# Patient Record
Sex: FEMALE | Race: WHITE | Employment: PART TIME | URBAN - METROPOLITAN AREA
[De-identification: names, ages, dates, MRNs, and addresses within clinical notes are randomized per-mention and may not be internally consistent; named-entity substitution may affect disease eponyms.]

---

## 2019-05-09 PROBLEM — G47.30 SLEEP APNEA: Status: ACTIVE | Noted: 2019-05-09

## 2019-05-09 PROBLEM — R60.9 EDEMA: Status: ACTIVE | Noted: 2019-05-09

## 2019-05-09 PROBLEM — R06.00 DYSPNEA: Status: ACTIVE | Noted: 2019-05-09

## 2019-10-16 PROBLEM — R06.02 SHORTNESS OF BREATH: Status: ACTIVE | Noted: 2019-10-16

## 2019-10-16 PROBLEM — I10 ESSENTIAL HYPERTENSION: Status: ACTIVE | Noted: 2019-10-16

## 2019-11-11 PROBLEM — R94.39 ABNORMAL CARDIOVASCULAR STRESS TEST: Status: ACTIVE | Noted: 2019-11-11

## 2019-11-15 ENCOUNTER — HOSPITAL ENCOUNTER (OUTPATIENT)
Dept: CARDIAC CATH/INVASIVE PROCEDURES | Age: 69
Discharge: HOME | End: 2019-11-15
Attending: INTERNAL MEDICINE | Admitting: INTERNAL MEDICINE
Payer: MEDICARE

## 2019-11-15 VITALS
HEIGHT: 65 IN | OXYGEN SATURATION: 93 % | SYSTOLIC BLOOD PRESSURE: 150 MMHG | DIASTOLIC BLOOD PRESSURE: 72 MMHG | WEIGHT: 270 LBS | TEMPERATURE: 98.1 F | BODY MASS INDEX: 44.98 KG/M2 | RESPIRATION RATE: 14 BRPM | HEART RATE: 82 BPM

## 2019-11-15 LAB
ALBUMIN SERPL-MCNC: 3.7 G/DL (ref 3.2–4.6)
ALBUMIN/GLOB SERPL: 0.9 {RATIO} (ref 1.2–3.5)
ALP SERPL-CCNC: 101 U/L (ref 50–136)
ALT SERPL-CCNC: 44 U/L (ref 12–65)
ANION GAP SERPL CALC-SCNC: 6 MMOL/L (ref 7–16)
AST SERPL-CCNC: 29 U/L (ref 15–37)
ATRIAL RATE: 94 BPM
BILIRUB SERPL-MCNC: 0.4 MG/DL (ref 0.2–1.1)
BUN SERPL-MCNC: 13 MG/DL (ref 8–23)
CALCIUM SERPL-MCNC: 9.1 MG/DL (ref 8.3–10.4)
CALCULATED P AXIS, ECG09: 56 DEGREES
CALCULATED R AXIS, ECG10: 35 DEGREES
CALCULATED T AXIS, ECG11: 51 DEGREES
CHLORIDE SERPL-SCNC: 107 MMOL/L (ref 98–107)
CO2 SERPL-SCNC: 29 MMOL/L (ref 21–32)
CREAT SERPL-MCNC: 0.56 MG/DL (ref 0.6–1)
DIAGNOSIS, 93000: NORMAL
ERYTHROCYTE [DISTWIDTH] IN BLOOD BY AUTOMATED COUNT: 12.4 % (ref 11.9–14.6)
GLOBULIN SER CALC-MCNC: 3.9 G/DL (ref 2.3–3.5)
GLUCOSE SERPL-MCNC: 106 MG/DL (ref 65–100)
HCT VFR BLD AUTO: 44.7 % (ref 35.8–46.3)
HGB BLD-MCNC: 14.8 G/DL (ref 11.7–15.4)
INR PPP: 0.9
MCH RBC QN AUTO: 30.4 PG (ref 26.1–32.9)
MCHC RBC AUTO-ENTMCNC: 33.1 G/DL (ref 31.4–35)
MCV RBC AUTO: 91.8 FL (ref 79.6–97.8)
NRBC # BLD: 0 K/UL (ref 0–0.2)
P-R INTERVAL, ECG05: 148 MS
PLATELET # BLD AUTO: 259 K/UL (ref 150–450)
PMV BLD AUTO: 10.8 FL (ref 9.4–12.3)
POTASSIUM SERPL-SCNC: 3.5 MMOL/L (ref 3.5–5.1)
PROT SERPL-MCNC: 7.6 G/DL (ref 6.3–8.2)
PROTHROMBIN TIME: 12.7 SEC (ref 11.7–14.5)
Q-T INTERVAL, ECG07: 354 MS
QRS DURATION, ECG06: 74 MS
QTC CALCULATION (BEZET), ECG08: 442 MS
RBC # BLD AUTO: 4.87 M/UL (ref 4.05–5.2)
SODIUM SERPL-SCNC: 142 MMOL/L (ref 136–145)
VENTRICULAR RATE, ECG03: 94 BPM
WBC # BLD AUTO: 7.5 K/UL (ref 4.3–11.1)

## 2019-11-15 PROCEDURE — 93005 ELECTROCARDIOGRAM TRACING: CPT | Performed by: INTERNAL MEDICINE

## 2019-11-15 PROCEDURE — 77030004534 HC CATH ANGI DX INFN CARD -A

## 2019-11-15 PROCEDURE — 93458 L HRT ARTERY/VENTRICLE ANGIO: CPT

## 2019-11-15 PROCEDURE — C1894 INTRO/SHEATH, NON-LASER: HCPCS

## 2019-11-15 PROCEDURE — C1769 GUIDE WIRE: HCPCS

## 2019-11-15 PROCEDURE — 99153 MOD SED SAME PHYS/QHP EA: CPT

## 2019-11-15 PROCEDURE — 74011250636 HC RX REV CODE- 250/636: Performed by: INTERNAL MEDICINE

## 2019-11-15 PROCEDURE — 85610 PROTHROMBIN TIME: CPT

## 2019-11-15 PROCEDURE — 77030015766

## 2019-11-15 PROCEDURE — 80053 COMPREHEN METABOLIC PANEL: CPT

## 2019-11-15 PROCEDURE — 77030029997 HC DEV COM RDL R BND TELE -B

## 2019-11-15 PROCEDURE — 74011250637 HC RX REV CODE- 250/637: Performed by: INTERNAL MEDICINE

## 2019-11-15 PROCEDURE — 74011000250 HC RX REV CODE- 250: Performed by: INTERNAL MEDICINE

## 2019-11-15 PROCEDURE — 85027 COMPLETE CBC AUTOMATED: CPT

## 2019-11-15 PROCEDURE — 99152 MOD SED SAME PHYS/QHP 5/>YRS: CPT

## 2019-11-15 PROCEDURE — 74011636320 HC RX REV CODE- 636/320: Performed by: INTERNAL MEDICINE

## 2019-11-15 RX ORDER — HEPARIN SODIUM 200 [USP'U]/100ML
3 INJECTION, SOLUTION INTRAVENOUS CONTINUOUS
Status: DISCONTINUED | OUTPATIENT
Start: 2019-11-15 | End: 2019-11-15 | Stop reason: HOSPADM

## 2019-11-15 RX ORDER — MIDAZOLAM HYDROCHLORIDE 1 MG/ML
.5-2 INJECTION, SOLUTION INTRAMUSCULAR; INTRAVENOUS
Status: DISCONTINUED | OUTPATIENT
Start: 2019-11-15 | End: 2019-11-15 | Stop reason: HOSPADM

## 2019-11-15 RX ORDER — METOPROLOL TARTRATE 5 MG/5ML
5 INJECTION INTRAVENOUS ONCE
Status: COMPLETED | OUTPATIENT
Start: 2019-11-15 | End: 2019-11-15

## 2019-11-15 RX ORDER — SODIUM CHLORIDE 0.9 % (FLUSH) 0.9 %
5-40 SYRINGE (ML) INJECTION EVERY 8 HOURS
Status: DISCONTINUED | OUTPATIENT
Start: 2019-11-15 | End: 2019-11-15 | Stop reason: HOSPADM

## 2019-11-15 RX ORDER — FENTANYL CITRATE 50 UG/ML
25-50 INJECTION, SOLUTION INTRAMUSCULAR; INTRAVENOUS
Status: DISCONTINUED | OUTPATIENT
Start: 2019-11-15 | End: 2019-11-15 | Stop reason: HOSPADM

## 2019-11-15 RX ORDER — SODIUM CHLORIDE 9 MG/ML
75 INJECTION, SOLUTION INTRAVENOUS CONTINUOUS
Status: DISCONTINUED | OUTPATIENT
Start: 2019-11-15 | End: 2019-11-15 | Stop reason: HOSPADM

## 2019-11-15 RX ORDER — LIDOCAINE HYDROCHLORIDE 10 MG/ML
3 INJECTION INFILTRATION; PERINEURAL ONCE
Status: COMPLETED | OUTPATIENT
Start: 2019-11-15 | End: 2019-11-15

## 2019-11-15 RX ORDER — SODIUM CHLORIDE 0.9 % (FLUSH) 0.9 %
5-40 SYRINGE (ML) INJECTION AS NEEDED
Status: DISCONTINUED | OUTPATIENT
Start: 2019-11-15 | End: 2019-11-15 | Stop reason: HOSPADM

## 2019-11-15 RX ORDER — MAG HYDROX/ALUMINUM HYD/SIMETH 200-200-20
30 SUSPENSION, ORAL (FINAL DOSE FORM) ORAL AS NEEDED
Status: DISCONTINUED | OUTPATIENT
Start: 2019-11-15 | End: 2019-11-15 | Stop reason: HOSPADM

## 2019-11-15 RX ORDER — GUAIFENESIN 100 MG/5ML
81-324 LIQUID (ML) ORAL ONCE
Status: DISCONTINUED | OUTPATIENT
Start: 2019-11-15 | End: 2019-11-15 | Stop reason: HOSPADM

## 2019-11-15 RX ADMIN — HEPARIN SODIUM 3 ML/HR: 5000 INJECTION, SOLUTION INTRAVENOUS; SUBCUTANEOUS at 14:50

## 2019-11-15 RX ADMIN — FENTANYL CITRATE 25 MCG: 50 INJECTION, SOLUTION INTRAMUSCULAR; INTRAVENOUS at 14:50

## 2019-11-15 RX ADMIN — ALUMINUM HYDROXIDE, MAGNESIUM HYDROXIDE, AND SIMETHICONE 30 ML: 200; 200; 20 SUSPENSION ORAL at 14:42

## 2019-11-15 RX ADMIN — SODIUM CHLORIDE 75 ML/HR: 900 INJECTION, SOLUTION INTRAVENOUS at 12:07

## 2019-11-15 RX ADMIN — FENTANYL CITRATE 25 MCG: 50 INJECTION, SOLUTION INTRAMUSCULAR; INTRAVENOUS at 14:38

## 2019-11-15 RX ADMIN — HEPARIN SODIUM 2 ML: 10000 INJECTION, SOLUTION INTRAVENOUS; SUBCUTANEOUS at 14:50

## 2019-11-15 RX ADMIN — IOPAMIDOL 70 ML: 755 INJECTION, SOLUTION INTRAVENOUS at 15:05

## 2019-11-15 RX ADMIN — LIDOCAINE HYDROCHLORIDE 3 ML: 10 INJECTION, SOLUTION INFILTRATION; PERINEURAL at 14:50

## 2019-11-15 RX ADMIN — METOPROLOL TARTRATE 5 MG: 5 INJECTION INTRAVENOUS at 14:53

## 2019-11-15 RX ADMIN — MIDAZOLAM 2 MG: 1 INJECTION INTRAMUSCULAR; INTRAVENOUS at 14:38

## 2019-11-15 RX ADMIN — MIDAZOLAM 1 MG: 1 INJECTION INTRAMUSCULAR; INTRAVENOUS at 14:50

## 2019-11-15 NOTE — PROGRESS NOTES
Terumo band completely deflated. 1655 Terumo band removed from right wrist using sterile technique. Sterile dressing applied. No signs and symptoms of bleeding, oozing or hematoma.

## 2019-11-15 NOTE — DISCHARGE INSTRUCTIONS
HEART CATHETERIZATION/ANGIOGRAPHY DISCHARGE INSTRUCTIONS    1. Check puncture site frequently for swelling or bleeding. If there is any bleeding, lie down and apply pressure over the area with a clean towel or washcloth and call 911. Notify your doctor for any redness, swelling, drainage, or oozing from the puncture site. Notify your doctor for any fever or chills. 2. If the extremity becomes cold, numb, or painful call Dr. Anna Noriega at [phone].  3. Activity should be limited for the next 48 hours. Avoid pushing, pulling and bending of affected wrist for 48 hours. No heavy lifting (anything over 5 pounds) for 3 days. No driving for 48 hours. 4. You may resume your usual diet. Drink more fluids than usual.  5. Have a responsible person drive you home and stay with you for at least 24 hours after your heart catheterization/angiography. 6. You may remove bandage from your right arm  in 24 hours. You may shower in 24 hours. No tub baths, hot tubs, or swimming for 1 week. Do not place any lotions, creams, powders, or ointments over puncture site for 1 week. You may place a clean band-aid over the puncture site each day for 5 days. Change daily. I have read the above instructions and have had the opportunity to ask questions.

## 2019-11-15 NOTE — PROCEDURES
Brief Cardiac Procedure Note    Patient: Giancarlo Cooper MRN: [id number]  SSN: [id number]    YOB: 1950  Age: 81 y.o. Sex: female      Date of Procedure: 11/15/2019     Pre-procedure Diagnosis: Atypical Angina    Post-procedure Diagnosis: Shortness of Breath    Reason for Procedure: New Onset Angina < or = 2 Months    Procedure: Left Heart Catheterization    Brief Description of Procedure: lhc via right radial, tr    Performed By: Florecita Vazquez MD     Assistants: none    Anesthesia: Moderate Sedation    Estimated Blood Loss: Less than 10 mL      Specimens: None    Implants: None    Findings: minimal cad, nl, lvef    Complications: None    Recommendations: Continue medical therapy.     Signed By: Florecita Vazquez MD     November 15, 2019

## 2019-11-15 NOTE — PROGRESS NOTES
Report received from [address] [place] [address]. Procedural findings communicated. Intra procedural  medication administration reviewed. Progression of care discussed.      Patient received into [address] [facility] 7 post sheath removal.     Access site without bleeding or swelling yes    Dressing dry and intact yes    Patient instructed to limit movement to right upper extremity    Routine post procedural vital signs and site assessment initiated yes

## 2019-11-15 NOTE — PROGRESS NOTES
Pt arrived, ambulated to room with no visible problems, planned C for Dr Anna Noriega. Consent signed, Procedure discussed with pt all questions answered voiced understanding. Medications and history discussed with pt. Pt prepped per ordersThe patient has a fraility score of 3-MANAGING WELL, based on ability to complete ADLS without assistance, increased symptoms on exertion.       Patient took Aspirin 324mg  today at 1000 prior to arrival.

## 2019-11-15 NOTE — PROGRESS NOTES
Called to pre-assess for [facility] poss with DR Magali Espinoza , Scheduled 11/15/19.  No answer & mailbox not set up - unable to leave message

## 2019-11-15 NOTE — PROGRESS NOTES
Patient up to bedside, vital signs and site stable. Patient ambulated to bathroom without difficulty. Patient voided without difficulty. Vascular site stable. Discharge instructions and home medications reviewed with patient. Time allowed for questions and answers. [phone] Patient ambulated second time without difficulty. Site stable after ambulation. Peripheral IV sites dc'd without difficulty with tips intact. [phone] Patient discharged to home with family.

## 2019-11-16 NOTE — PROCEDURES
[facility] 
CARDIAC CATH Name:  Consuello Osgood 
MR#:  [id number] :  1950 ACCOUNT #:  [de-identified] DATE OF SERVICE:  11/15/2019 PROCEDURES PERFORMED:  Left heart catheterization via the right radial artery with a 5-Danish Tiger catheter and angled pigtail. TR band was placed with good hemostasis. Also, a Franco right catheter was used for cannulation of the RCA. PREOPERATIVE DIAGNOSES:  Shortness of breath, abnormal stress test. 
 
POSTOPERATIVE DIAGNOSIS:  Noncardiac findings. SURGEON:  Kiko Glass MD 
 
ASSISTANT:  None. ESTIMATED BLOOD LOSS:  5 mL SPECIMENS REMOVED:  None. COMPLICATIONS:  None. IMPLANTS:  None. ANESTHESIA:  Conscious sedation was given under my direct supervision by Sushma Hernandez RN, beginning at 1438, concluding at 1510 with a total of 3 mg Versed and 50 mcg fentanyl given. Vital signs and saturation were stable throughout. FINDINGS: 
The left ventricle is normal size with normal systolic function. Ejection fraction is 60%. Left ventricular end-diastolic pressure is 10 mmHg with an opening aortic pressure of 157/78. No gradient across the aortic valve. The left main coronary artery is in normal anatomic position, free of significant disease. Bifurcates into LAD and circumflex systems. No disease in the left main. The LAD has an anatomic hinge point with mild irregularities up to about 20% through this portion. This is not stenotic. The mid and distal LAD as well as distal diagonal branch are free of significant disease. The circumflex has a small proximal bifurcating obtuse marginal branch and then an even smaller distal obtuse marginal branch. No atherosclerosis seen in the circumflex system. The right coronary artery is a large dominant vessel in normal anatomic position. Mild 10% to 20% mid-vessel irregularity.   The remainder of the RCA proper, PDA, and posterolateral obtuse marginal branches are free of significant disease. CONCLUSION:  Minimal coronary atherosclerotic heart disease with preserved LV systolic function. Misti Leavitt MD 
 
 
[id number] 
D:  11/15/2019 16:38 
T:  11/16/2019 9:01 JOB #:  [id number]

## 2019-11-16 NOTE — PROCEDURES
[facility]  CARDIAC CATH    Name:  Alyson Dempsey  MR#:  [id number]  :  1950  ACCOUNT #:  [de-identified]  DATE OF SERVICE:  11/15/2019    PROCEDURES PERFORMED:  Left heart catheterization via the right radial artery with a 5-Telugu Tiger catheter and angled pigtail. TR band was placed with good hemostasis. Also, a Franco right catheter was used for cannulation of the RCA. PREOPERATIVE DIAGNOSES:  Shortness of breath, abnormal stress test.    POSTOPERATIVE DIAGNOSIS:  Noncardiac findings. SURGEON:  Mai Rodriguez MD    ASSISTANT:  None. ESTIMATED BLOOD LOSS:  5 mL    SPECIMENS REMOVED:  None. COMPLICATIONS:  None. IMPLANTS:  None. ANESTHESIA:  Conscious sedation was given under my direct supervision by Melissa Shahid RN, beginning at 1438, concluding at 1510 with a total of 3 mg Versed and 50 mcg fentanyl given. Vital signs and saturation were stable throughout. FINDINGS:  The left ventricle is normal size with normal systolic function. Ejection fraction is 60%. Left ventricular end-diastolic pressure is 10 mmHg with an opening aortic pressure of 157/78. No gradient across the aortic valve. The left main coronary artery is in normal anatomic position, free of significant disease. Bifurcates into LAD and circumflex systems. No disease in the left main. The LAD has an anatomic hinge point with mild irregularities up to about 20% through this portion. This is not stenotic. The mid and distal LAD as well as distal diagonal branch are free of significant disease. The circumflex has a small proximal bifurcating obtuse marginal branch and then an even smaller distal obtuse marginal branch. No atherosclerosis seen in the circumflex system. The right coronary artery is a large dominant vessel in normal anatomic position. Mild 10% to 20% mid-vessel irregularity.   The remainder of the RCA proper, PDA, and posterolateral obtuse marginal branches are free of significant disease. CONCLUSION:  Minimal coronary atherosclerotic heart disease with preserved LV systolic function.       Lizy Morgan MD      [id number]  D:  11/15/2019 16:38  T:  11/16/2019 9:01  JOB #:  [id number] / [id number]

## 2025-03-18 NOTE — PROGRESS NOTES
TRANSFER - OUT REPORT:    Verbal report given to Adriana Lake RN(name) on Mariaa Dyer  being transferred to CPRU(unit) for routine progression of care       Report consisted of patients Situation, Background, Assessment and   Recommendations(SBAR). Information from the following report(s) SBAR was reviewed with the receiving nurse.     [facility] w/ Dr. Brandie Gary  No interventions  R radial approach  TR band 12 mls  3 mg versed  50 mcg fentanyl  5 mg lopressor negative normal affect/alert and oriented x3/normal behavior